# Patient Record
Sex: FEMALE | Race: AMERICAN INDIAN OR ALASKA NATIVE | NOT HISPANIC OR LATINO | URBAN - METROPOLITAN AREA
[De-identification: names, ages, dates, MRNs, and addresses within clinical notes are randomized per-mention and may not be internally consistent; named-entity substitution may affect disease eponyms.]

---

## 2023-04-25 ENCOUNTER — OUTPATIENT (OUTPATIENT)
Dept: OUTPATIENT SERVICES | Facility: HOSPITAL | Age: 68
LOS: 1 days | Discharge: HOME | End: 2023-04-25
Payer: MEDICARE

## 2023-04-25 PROCEDURE — 99214 OFFICE O/P EST MOD 30 MIN: CPT | Mod: 95

## 2023-04-26 DIAGNOSIS — F33.3 MAJOR DEPRESSIVE DISORDER, RECURRENT, SEVERE WITH PSYCHOTIC SYMPTOMS: ICD-10-CM

## 2023-05-09 PROCEDURE — 99214 OFFICE O/P EST MOD 30 MIN: CPT | Mod: 95

## 2023-08-17 ENCOUNTER — OUTPATIENT (OUTPATIENT)
Dept: OUTPATIENT SERVICES | Facility: HOSPITAL | Age: 68
LOS: 1 days | Discharge: HOME | End: 2023-08-17
Payer: MEDICARE

## 2023-08-17 PROCEDURE — 90792 PSYCH DIAG EVAL W/MED SRVCS: CPT

## 2023-09-07 PROCEDURE — 90833 PSYTX W PT W E/M 30 MIN: CPT

## 2023-09-07 PROCEDURE — 99213 OFFICE O/P EST LOW 20 MIN: CPT

## 2023-09-13 DIAGNOSIS — F41.9 ANXIETY DISORDER, UNSPECIFIED: ICD-10-CM

## 2023-09-13 DIAGNOSIS — F33.3 MAJOR DEPRESSIVE DISORDER, RECURRENT, SEVERE WITH PSYCHOTIC SYMPTOMS: ICD-10-CM

## 2023-09-13 DIAGNOSIS — Z95.0 PRESENCE OF CARDIAC PACEMAKER: ICD-10-CM

## 2023-09-26 PROCEDURE — 90832 PSYTX W PT 30 MINUTES: CPT

## 2023-09-28 PROCEDURE — 90833 PSYTX W PT W E/M 30 MIN: CPT

## 2023-09-28 PROCEDURE — 99213 OFFICE O/P EST LOW 20 MIN: CPT

## 2023-10-26 PROCEDURE — 99213 OFFICE O/P EST LOW 20 MIN: CPT

## 2023-10-26 PROCEDURE — 90833 PSYTX W PT W E/M 30 MIN: CPT

## 2023-12-05 PROCEDURE — 90833 PSYTX W PT W E/M 30 MIN: CPT

## 2023-12-05 PROCEDURE — 99213 OFFICE O/P EST LOW 20 MIN: CPT

## 2024-01-16 PROCEDURE — 99214 OFFICE O/P EST MOD 30 MIN: CPT

## 2024-01-16 PROCEDURE — 90833 PSYTX W PT W E/M 30 MIN: CPT

## 2024-05-17 ENCOUNTER — EMERGENCY (EMERGENCY)
Facility: HOSPITAL | Age: 69
LOS: 1 days | Discharge: HOME | End: 2024-05-17
Attending: STUDENT IN AN ORGANIZED HEALTH CARE EDUCATION/TRAINING PROGRAM | Admitting: EMERGENCY MEDICINE
Payer: MEDICARE

## 2024-05-17 VITALS
RESPIRATION RATE: 16 BRPM | DIASTOLIC BLOOD PRESSURE: 67 MMHG | TEMPERATURE: 99 F | OXYGEN SATURATION: 94 % | SYSTOLIC BLOOD PRESSURE: 129 MMHG | HEART RATE: 82 BPM

## 2024-05-17 PROCEDURE — 73110 X-RAY EXAM OF WRIST: CPT | Mod: 26,RT

## 2024-05-17 PROCEDURE — 73130 X-RAY EXAM OF HAND: CPT | Mod: 26,RT

## 2024-05-17 PROCEDURE — 99284 EMERGENCY DEPT VISIT MOD MDM: CPT | Mod: FS,GC

## 2024-05-17 PROCEDURE — 93010 ELECTROCARDIOGRAM REPORT: CPT

## 2024-05-17 PROCEDURE — 72170 X-RAY EXAM OF PELVIS: CPT | Mod: 26

## 2024-05-17 PROCEDURE — 73090 X-RAY EXAM OF FOREARM: CPT | Mod: 26,RT

## 2024-05-17 NOTE — ED PROVIDER NOTE - NSFOLLOWUPINSTRUCTIONS_ED_ALL_ED_FT
You were seen in the ED for wrist pain after a fall.  Your evaluated with x-rays which returned negative.  You decided to leave refuses medical advice and not get a CAT scan and decided to leave.  We explained the risks and benefits of receiving the CAT scan.  If your symptoms worsen, please return to the ED. You were seen in the ED for fall.  Your evaluated x-rays and a CAT scan.  X-rays returned negative for acute fracture of your wrist, CAT scan returned negative for acute bleeding in the brain.  No further acute intervention was required in ED.  If your symptoms worsen, please return to ED.  This includes with recurrent falls, worsening wrist pain, swelling, redness.    Fall Prevention    WHAT YOU NEED TO KNOW:    Fall prevention includes ways to make your home and other areas safer. It also includes ways you can move more carefully to prevent a fall. Health conditions that cause changes in your blood pressure, vision, or muscle strength and coordination may increase your risk for falls. Medicines may also increase your risk for falls if they make you dizzy, weak, or sleepy.     DISCHARGE INSTRUCTIONS:    Call 911 or have someone else call if:     You have fallen and are unconscious.      You have fallen and cannot move part of your body.    Contact your healthcare provider if:     You have fallen and have pain or a headache.      You have questions or concerns about your condition or care.    Fall prevention tips:     Stand or sit up slowly. This may help you keep your balance and prevent falls.      Use assistive devices as directed. Your healthcare provider may suggest that you use a cane or walker to help you keep your balance. You may need to have grab bars put in your bathroom near the toilet or in the shower.      Wear shoes that fit well and have soles that . Wear shoes both inside and outside. Use slippers with good . Do not wear shoes with high heels.      Wear a personal alarm. This is a device that allows you to call 911 if you fall and need help. Ask your healthcare provider for more information.      Stay active. Exercise can help strengthen your muscles and improve your balance. Your healthcare provider may recommend water aerobics or walking. He or she may also recommend physical therapy to improve your coordination. Never start an exercise program without talking to your healthcare provider first.       Manage your medical conditions. Keep all appointments with your healthcare providers. Visit your eye doctor as directed.    Home safety tips:     Add items to prevent falls in the bathroom. Put nonslip strips on your bath or shower floor to prevent you from slipping. Use a bath mat if you do not have carpet in the bathroom. This will prevent you from falling when you step out of the bath or shower. Use a shower seat so you do not need to stand while you shower. Sit on the toilet or a chair in your bathroom to dry yourself and put on clothing. This will prevent you from losing your balance from drying or dressing yourself while you are standing.       Keep paths clear. Remove books, shoes, and other objects from walkways and stairs. Place cords for telephones and lamps out of the way so that you do not need to walk over them. Tape them down if you cannot move them. Remove small rugs. If you cannot remove a rug, secure it with double-sided tape. This will prevent you from tripping.       Install bright lights in your home. Use night lights to help light paths to the bathroom or kitchen. Always turn on the light before you start walking.      Keep items you use often on shelves within reach. Do not use a step stool to help you reach an item.      Paint or place reflective tape on the edges of your stairs. This will help you see the stairs better.    Follow up with your healthcare provider as directed: Write down your questions so you remember to ask them during your visits.

## 2024-05-17 NOTE — ED PROVIDER NOTE - PHYSICAL EXAMINATION
constitutional: well appearing no acute distress, sitting comfortably   HEENT: head- normocephalic, atraumatic   Cardiac: regular rate and rhythm, normal S1 S2 no murmurs rubs or gallops  Respiratory: able to speak in full sentences, no wheezing rales or rhonchi, lungs CTA b/l   Abd: Soft non tender non distended, no guarding, no palpable hernias or masses   Msk: no LE edema, no tenderness b/l, no hip tenderness. R ulnar wrist tenderness with swelling  Neuro: A x O x 2, able to follow commands, 5/5 b/l UE  strength, b/l LE 5/5 strength

## 2024-05-17 NOTE — ED PROVIDER NOTE - OBJECTIVE STATEMENT
68yoF with pmh dementia, DM, HTN, CKD on HD (MWF- last HD today), a fib with PPM on eliquis, hx of MSSA bacteremia with hx of osteomyelitis bib son for R wrist pain and swelling s/p fall 1 week ago. After HD today, nephrologist advised pt to go to ED for xrays to r/o fx.  Per son he did not witness fall, states he saw pt on the floor. Unsure if pt had head injury. Since fall pt did not have any changes in mental status. Recently had routine screening for osteomyelitis with CT neck. Son able to provided copy of ct neck results on his patient portal through [facility]. CT neck results showing kyphotic deformities with loss of disc height, no new fractures. Pt poor historian, hx provided by son. Son denies any AMS, cp sob, dizziness, vomiting.

## 2024-05-17 NOTE — ED PROVIDER NOTE - ATTENDING CONTRIBUTION TO CARE
68-year-old female past medical history of dementia, diabetes, hypertension, ESRD on dialysis, last dialysis today, A-fib on Eliquis presents to ED for evaluation of right wrist pain status post fall 1 week ago.  Sent to ED by nephrologist after dialysis for x-rays of the wrist.  Fall was unwitnessed, patient was found on the floor.  Patient is a poor historian, was evaluated at another hospital after the fall with CT imaging.  No wrist imaging was done at that time A/P Labs, imaging, medicate, reassess

## 2024-05-17 NOTE — ED ADULT NURSE NOTE - NSFALLHARMRISKINTERV_ED_ALL_ED

## 2024-05-17 NOTE — ED PROVIDER NOTE - PROGRESS NOTE DETAILS
I informed this patient of the need for further medical evaluation and care given the patient's current medical condition.   This patient declined further medical evaluation and treatment.  I informed this patient of the benefits of further medical evaluation and care at this facility.  I informed this patient of the risks of leaving against our medical advice without properly completing our evaluation today that include illness, injury, permenant disability and even death.  The patient was also informed about alternatives.  I informed the patient of the possible necessity for hospital admission depending on future findings.   At the time of discussion this patient maintained full faculties of judgement and medical decision making capacity.    In accordance with this patient's wishes the patient was discharged from the emergency department against our medical advice in stable condition with normal vital signs in no acute distress. Iveth Trivedi, PGY1    XR neg, CT neg for acute changes. stable for DC

## 2024-05-17 NOTE — ED ADULT TRIAGE NOTE - CHIEF COMPLAINT QUOTE
patient states" I slip and fell on the floor 3 days ago c/o right hand pain. endorses to hit her head." patient takes Eliquis and unable to tell me why " patient cis coming from [facility] .

## 2024-05-17 NOTE — ED ADULT NURSE NOTE - CHIEF COMPLAINT QUOTE
patient states" I slip and fell on the floor 3 days ago c/o right hand pain. endorses to hit her head." patient takes Eliquis and unable to tell me why " patient cis coming from [facility] .

## 2024-05-17 NOTE — ED PROVIDER NOTE - CLINICAL SUMMARY MEDICAL DECISION MAKING FREE TEXT BOX
68yoF with pmh dementia, DM, HTN, CKD on HD (MWF- last HD today), a fib with PPM on eliquis, hx of MSSA bacteremia with hx of osteomyelitis bib son for R wrist pain and swelling s/p fall 1 week ago. Will r/o fx with R hand, R wrist and forearm xr. CTH

## 2024-05-17 NOTE — ED PROVIDER NOTE - PATIENT PORTAL LINK FT
You can access the FollowMyHealth Patient Portal offered by [facility] by registering at the following website: [email or web address]. By joining [employer]’s FollowMyHealth portal, you will also be able to view your health information using other applications (apps) compatible with our system. You can access the FollowMyHealth Patient Portal offered by [facility] by registering at the following website: [email or web address]. By joining [employer]’s FollowMyHealth portal, you will also be able to view your health information using other applications (apps) compatible with our system. You can access the FollowMyHealth Patient Portal offered by [facility] by registering at the following website: [email or web address]. By joining [employer]’s FollowMyHealth portal, you will also be able to view your health information using other applications (apps) compatible with our system.

## 2024-05-17 NOTE — ED ADULT NURSE NOTE - OBJECTIVE STATEMENT
Receive pt. in ER 17 a alert and oriented x 3, presenting to ER with complaints of pain in her right hand. Pt. stated ' I fell 3 days ago while I was walking with my walker, and I fell on my right arm" Right arm wrist area with edema and limited mobility, warm to touch. Left upper arm with a-v fistula positive bruit and thrill. Pt. c/o moderate pain in right wrist.

## 2024-05-18 VITALS
TEMPERATURE: 99 F | HEART RATE: 60 BPM | RESPIRATION RATE: 17 BRPM | SYSTOLIC BLOOD PRESSURE: 132 MMHG | OXYGEN SATURATION: 98 % | DIASTOLIC BLOOD PRESSURE: 63 MMHG

## 2024-05-18 PROCEDURE — 70450 CT HEAD/BRAIN W/O DYE: CPT | Mod: 26,MC

## 2025-03-11 PROCEDURE — 90833 PSYTX W PT W E/M 30 MIN: CPT

## 2025-03-11 PROCEDURE — 99214 OFFICE O/P EST MOD 30 MIN: CPT

## 2025-04-28 ENCOUNTER — APPOINTMENT (OUTPATIENT)
Dept: ORTHOPEDIC SURGERY | Facility: CLINIC | Age: 70
End: 2025-04-28
Payer: MEDICARE

## 2025-04-28 DIAGNOSIS — S62.356A NONDISPLACED FRACTURE OF SHAFT OF FIFTH METACARPAL BONE, RIGHT HAND, INITIAL ENCOUNTER FOR CLOSED FRACTURE: ICD-10-CM

## 2025-04-28 DIAGNOSIS — I51.9 HEART DISEASE, UNSPECIFIED: ICD-10-CM

## 2025-04-28 DIAGNOSIS — Z99.2 DEPENDENCE ON RENAL DIALYSIS: ICD-10-CM

## 2025-04-28 DIAGNOSIS — J98.4 OTHER DISORDERS OF LUNG: ICD-10-CM

## 2025-04-28 PROBLEM — Z00.00 ENCOUNTER FOR PREVENTIVE HEALTH EXAMINATION: Status: ACTIVE | Noted: 2025-04-28

## 2025-04-28 PROCEDURE — L3908: CPT

## 2025-04-28 PROCEDURE — 99204 OFFICE O/P NEW MOD 45 MIN: CPT | Mod: 57

## 2025-04-28 PROCEDURE — 26600 TREAT METACARPAL FRACTURE: CPT

## 2025-05-19 ENCOUNTER — APPOINTMENT (OUTPATIENT)
Dept: ORTHOPEDIC SURGERY | Facility: CLINIC | Age: 70
End: 2025-05-19